# Patient Record
Sex: MALE | Race: BLACK OR AFRICAN AMERICAN
[De-identification: names, ages, dates, MRNs, and addresses within clinical notes are randomized per-mention and may not be internally consistent; named-entity substitution may affect disease eponyms.]

---

## 2018-05-19 ENCOUNTER — HOSPITAL ENCOUNTER (EMERGENCY)
Dept: HOSPITAL 76 - ED | Age: 18
LOS: 1 days | Discharge: HOME | End: 2018-05-20
Payer: COMMERCIAL

## 2018-05-19 DIAGNOSIS — K91.89: Primary | ICD-10-CM

## 2018-05-19 LAB
ALBUMIN DIAFP-MCNC: 4.2 G/DL (ref 3.2–5.5)
ALBUMIN/GLOB SERPL: 1.1 {RATIO} (ref 1–2.2)
ALP SERPL-CCNC: 63 IU/L (ref 42–121)
ALT SERPL W P-5'-P-CCNC: 106 IU/L (ref 10–60)
ANION GAP SERPL CALCULATED.4IONS-SCNC: 9 MMOL/L (ref 6–13)
AST SERPL W P-5'-P-CCNC: 45 IU/L (ref 10–42)
BASOPHILS NFR BLD AUTO: 0.2 10^3/UL (ref 0–0.1)
BASOPHILS NFR BLD AUTO: 1.7 %
BILIRUB BLD-MCNC: 1.4 MG/DL (ref 0.2–1)
BUN SERPL-MCNC: 14 MG/DL (ref 6–20)
CALCIUM UR-MCNC: 9.5 MG/DL (ref 8.5–10.3)
CHLORIDE SERPL-SCNC: 98 MMOL/L (ref 101–111)
CLARITY UR REFRACT.AUTO: CLEAR
CO2 SERPL-SCNC: 29 MMOL/L (ref 21–32)
CREAT SERPLBLD-SCNC: 1 MG/DL (ref 0.6–1.2)
EOSINOPHIL # BLD AUTO: 0.5 10^3/UL (ref 0–0.7)
EOSINOPHIL NFR BLD AUTO: 5.1 %
ERYTHROCYTE [DISTWIDTH] IN BLOOD BY AUTOMATED COUNT: 13.5 % (ref 12–15)
GFRSERPLBLD MDRD-ARVRAT: 95 ML/MIN/{1.73_M2} (ref 89–?)
GLOBULIN SER-MCNC: 3.9 G/DL (ref 2.1–4.2)
GLUCOSE SERPL-MCNC: 100 MG/DL (ref 70–100)
GLUCOSE UR QL STRIP.AUTO: NEGATIVE MG/DL
HGB UR QL STRIP: 12.1 G/DL (ref 14–18)
KETONES UR QL STRIP.AUTO: NEGATIVE MG/DL
LIPASE SERPL-CCNC: 19 U/L (ref 22–51)
LYMPHOCYTES # SPEC AUTO: 1.5 10^3/UL (ref 1.5–3.5)
LYMPHOCYTES NFR BLD AUTO: 15.4 %
MCH RBC QN AUTO: 29.4 PG (ref 27–31)
MCHC RBC AUTO-ENTMCNC: 33.7 G/DL (ref 32–36)
MCV RBC AUTO: 87.2 FL (ref 80–94)
MONOCYTES # BLD AUTO: 1.2 10^3/UL (ref 0–1)
MONOCYTES NFR BLD AUTO: 12.8 %
NEUTROPHILS # BLD AUTO: 6.2 10^3/UL (ref 1.5–6.6)
NEUTROPHILS # SNV AUTO: 9.5 X10^3/UL (ref 4.8–10.8)
NEUTROPHILS NFR BLD AUTO: 65 %
NITRITE UR QL STRIP.AUTO: NEGATIVE
PDW BLD AUTO: 8.8 FL (ref 7.4–11.4)
PH UR STRIP.AUTO: 6.5 PH (ref 5–7.5)
PLATELET # BLD: 253 10^3/UL (ref 130–450)
PROT SPEC-MCNC: 8.1 G/DL (ref 6.7–8.2)
PROT UR STRIP.AUTO-MCNC: NEGATIVE MG/DL
RBC # UR STRIP.AUTO: NEGATIVE /UL
RBC MAR: 4.1 10^6/UL (ref 4.7–6.1)
SODIUM SERPLBLD-SCNC: 136 MMOL/L (ref 135–145)
SP GR UR STRIP.AUTO: 1.01 (ref 1–1.03)
UROBILINOGEN UR QL STRIP.AUTO: (no result) E.U./DL
UROBILINOGEN UR STRIP.AUTO-MCNC: NEGATIVE MG/DL

## 2018-05-19 PROCEDURE — 87086 URINE CULTURE/COLONY COUNT: CPT

## 2018-05-19 PROCEDURE — 99284 EMERGENCY DEPT VISIT MOD MDM: CPT

## 2018-05-19 PROCEDURE — 74177 CT ABD & PELVIS W/CONTRAST: CPT

## 2018-05-19 PROCEDURE — 96374 THER/PROPH/DIAG INJ IV PUSH: CPT

## 2018-05-19 PROCEDURE — 81001 URINALYSIS AUTO W/SCOPE: CPT

## 2018-05-19 PROCEDURE — 36415 COLL VENOUS BLD VENIPUNCTURE: CPT

## 2018-05-19 PROCEDURE — 85025 COMPLETE CBC W/AUTO DIFF WBC: CPT

## 2018-05-19 PROCEDURE — 99283 EMERGENCY DEPT VISIT LOW MDM: CPT

## 2018-05-19 PROCEDURE — 81003 URINALYSIS AUTO W/O SCOPE: CPT

## 2018-05-19 PROCEDURE — 96361 HYDRATE IV INFUSION ADD-ON: CPT

## 2018-05-19 PROCEDURE — 80053 COMPREHEN METABOLIC PANEL: CPT

## 2018-05-19 PROCEDURE — 83605 ASSAY OF LACTIC ACID: CPT

## 2018-05-19 PROCEDURE — 83690 ASSAY OF LIPASE: CPT

## 2018-05-19 RX ADMIN — ONDANSETRON STA MG: 2 INJECTION INTRAMUSCULAR; INTRAVENOUS at 22:13

## 2018-05-19 RX ADMIN — ONDANSETRON STA MG: 2 INJECTION INTRAMUSCULAR; INTRAVENOUS at 22:04

## 2018-05-19 NOTE — ED PHYSICIAN DOCUMENTATION
PD HPI ABD PAIN





- Stated complaint


Stated Complaint: ABD PX





- Chief complaint


Chief Complaint: Abd Pain





- History obtained from


History obtained from: Patient, Family





- History of Present Illness


Timing - onset: Today


Timing - details: Gradual onset, Still present


Quality: Cramping, Aching


Location: All over / everywhere, RLQ


Worsened by: Position, Palpation


Associated symptoms: Nausea, Constipation.  No: Fever


Similar symptoms before: Work up / diagnostics, Treatment


Recently seen: Surgery





- Additional information


Additional information: 





Patient is a 18 year old male status post appendectomy three days prior who is 

presenting to the emergency department for abdominal pain.  According to mother 

and patient, patient had a complicated surgery after his appendix had ruptured 

a few months before.  Patient states that today he developed worsening 

abdominal pain, and dysuria.  Patient states that his last bowel movement was 

yesterday.  





Review of Systems


Ten Systems: 10 systems reviewed and negative


Constitutional: reports: Chills.  denies: Fever


GI: reports: Abdominal Pain, Constipation.  denies: Nausea, Vomiting


: reports: Dysuria





PD PAST MEDICAL HISTORY





- Past Medical History


Past Medical History: Yes





- Past Surgical History


Past Surgical History: Yes


General: Appendectomy





- Present Medications


Home Medications: 


 Ambulatory Orders











 Medication  Instructions  Recorded  Confirmed


 


oxyCODONE/ACET 5/325 [Percocet 5 1 tab PO Q6H PRN 05/19/18 05/19/18





mg/325 mg]   














- Allergies


Allergies/Adverse Reactions: 


 Allergies











Allergy/AdvReac Type Severity Reaction Status Date / Time


 


No Known Drug Allergies Allergy   Verified 05/19/18 21:37














- Social History


Does the pt smoke?: No


Smoking Status: Never smoker


Does the pt drink ETOH?: No


Does the pt have substance abuse?: No





- Immunizations


Immunizations are current?: Yes





- POLST


Patient has POLST: No





PD ED PE NORMAL





- Vitals


Vital signs reviewed: Yes





- General


General: Alert and oriented X 3, No acute distress, Well developed/nourished





- HEENT


HEENT: Atraumatic, PERRL





- Neck


Neck: Supple, no meningeal sign





- Cardiac


Cardiac: RRR





- Respiratory


Respiratory: No respiratory distress, Clear bilaterally





- Derm


Derm: Normal color





- Extremities


Extremities: No deformity





- Neuro


Neuro: Alert and oriented X 3


Eye Opening: Spontaneous





PD ED PE EXPANDED





- Abdomen


Abdomen: Tender to palpation, Generalized/diffuse (diffuse tenderness to 

palpation,  mild ecchymosis around incision sites ), Surgical scars





Results





- Vitals


Vitals: 


 Vital Signs - 24 hr











  05/19/18 05/20/18





  21:32 00:06


 


Temperature 37.5 C 


 


Heart Rate 92 76


 


Respiratory 16 16





Rate  


 


Blood Pressure 130/73 120/76


 


O2 Saturation 100 98








 Oxygen











O2 Source                      Room air

















- Labs


Labs: 


 Laboratory Tests











  05/19/18 05/19/18 05/19/18





  22:00 22:00 22:00


 


WBC  9.5  


 


RBC  4.10 L  


 


Hgb  12.1 L  


 


Hct  35.8 L  


 


MCV  87.2  


 


MCH  29.4  


 


MCHC  33.7  


 


RDW  13.5  


 


Plt Count  253  


 


MPV  8.8  


 


Neut #  6.2  


 


Lymph #  1.5  


 


Mono #  1.2 H  


 


Eos #  0.5  


 


Baso #  0.2 H  


 


Absolute Nucleated RBC  0.00  


 


Nucleated RBC %  0.0  


 


Sodium   136 


 


Potassium   3.9 


 


Chloride   98 L 


 


Carbon Dioxide   29 


 


Anion Gap   9.0 


 


BUN   14 


 


Creatinine   1.0 


 


Estimated GFR (MDRD)   95 


 


Glucose   100 


 


Lactic Acid    1.0


 


Calcium   9.5 


 


Total Bilirubin   1.4 H 


 


AST   45 H 


 


ALT   106 H 


 


Alkaline Phosphatase   63 


 


Total Protein   8.1 


 


Albumin   4.2 


 


Globulin   3.9 


 


Albumin/Globulin Ratio   1.1 


 


Lipase   19 L 


 


Urine Color   


 


Urine Clarity   


 


Urine pH   


 


Ur Specific Gravity   


 


Urine Protein   


 


Urine Glucose (UA)   


 


Urine Ketones   


 


Urine Occult Blood   


 


Urine Nitrite   


 


Urine Bilirubin   


 


Urine Urobilinogen   


 


Ur Leukocyte Esterase   


 


Ur Microscopic Review   


 


Urine Culture Comments   














  05/19/18





  23:01


 


WBC 


 


RBC 


 


Hgb 


 


Hct 


 


MCV 


 


MCH 


 


MCHC 


 


RDW 


 


Plt Count 


 


MPV 


 


Neut # 


 


Lymph # 


 


Mono # 


 


Eos # 


 


Baso # 


 


Absolute Nucleated RBC 


 


Nucleated RBC % 


 


Sodium 


 


Potassium 


 


Chloride 


 


Carbon Dioxide 


 


Anion Gap 


 


BUN 


 


Creatinine 


 


Estimated GFR (MDRD) 


 


Glucose 


 


Lactic Acid 


 


Calcium 


 


Total Bilirubin 


 


AST 


 


ALT 


 


Alkaline Phosphatase 


 


Total Protein 


 


Albumin 


 


Globulin 


 


Albumin/Globulin Ratio 


 


Lipase 


 


Urine Color  YELLOW


 


Urine Clarity  CLEAR


 


Urine pH  6.5


 


Ur Specific Gravity  1.010


 


Urine Protein  NEGATIVE


 


Urine Glucose (UA)  NEGATIVE


 


Urine Ketones  NEGATIVE


 


Urine Occult Blood  NEGATIVE


 


Urine Nitrite  NEGATIVE


 


Urine Bilirubin  NEGATIVE


 


Urine Urobilinogen  0.2 (NORMAL)


 


Ur Leukocyte Esterase  NEGATIVE


 


Ur Microscopic Review  NOT INDICATED


 


Urine Culture Comments  NOT INDICATED














- Rads (name of study)


  ** ct abd pelvis


Radiology: Final report received, Discussed with rads (multiple hematomas), See 

rad report





PD MEDICAL DECISION MAKING





- ED course


Complexity details: reviewed old records, reviewed results, re-evaluated patient

, considered differential, d/w patient, d/w family, d/w consultant


ED course: 





Patient was seen and examined at bedside.  IV access was gained and labs were 

drawn.  patient was treated with IV fluids but he stated he did not want pain 

medication.  Imaging was ordered.  When patient returned from imaging the 

results were discussed with radiologist who stated that there were two 

hematomas in the abdomen.  Case was then discussed with on call surgeon who 

stated if the patient was well appearing, a febrile and no white count he could 

follow up with his surgeon.  Patient and family were made aware of the 

findings.  They were comfortable with the discharge and follow up plans.  

patient was stable for discharge with outpatient follow up.  





Departure





- Departure


Disposition: 01 Home, Self Care


Clinical Impression: 


 GI complications of surgery





Condition: Good


Instructions:  Abdominal Pain


Follow-Up: 


primary,care provider [Other] - Within 3 Days


Comments: 


Your CT today showed hematomas but no signs of infection.  It is important that 

you follow up with your surgeon tomorrow or monday.  You can take motrin (600mg

) and tylenol (1000 mg).for pain.  You should return to the emergency 

department for fevers, chills, new, worsening or unocntrollable symptoms.  


Discharge Date/Time: 05/20/18 00:06

## 2018-05-19 NOTE — CT REPORT
EXAM:

CT ABDOMEN AND PELVIS

 

EXAM DATE: 5/19/2018 10:56 PM.

 

CLINICAL HISTORY: Abdominal pain

 

COMPARISONS: None.

 

TECHNIQUE: Routine helical CT imaging was performed through the abdomen and pelvis. IV contrast: 100 
ML ISOVUE 300. Enteric contrast: No. Reconstructions: Coronal and sagittal.

 

In accordance with CT protocol optimization, one or more of the following dose reduction techniques w
ere utilized for this exam: automated exposure control, adjustment of mA and/or KV based on patient s
ize, or use of iterative reconstructive technique.

 

FINDINGS: 

Lung Bases: Unremarkable.

 

Liver: Normal. No masses.

 

Gallbladder/Bile Ducts: Unremarkable.

 

Spleen: Normal.

 

Pancreas: Normal.

 

Adrenal Glands: Normal.

 

Kidneys: There are areas of ill-defined cortical hypodensity within the kidneys. No significant perin
ephric stranding. No hydronephrosis.

 

Peritoneal Cavity/Bowel: There is an area of hyperdensity measuring approximately 6 x 8 cm within the
 lower anterior abdomen (image 66 series 3). There is a second area of focal hyperdensity measuring a
pproximate 6 x 7 cm within the pelvis (image 77). These are consistent with areas of hematoma. There 
is a small amount of free hemoperitoneum within the lower abdomen and pelvis. The appendix is surgica
lly absent. No intraperitoneal free air. Stomach, small bowel, and colon demonstrate no acute abnorma
lities.

 

Pelvic Organs: The urinary bladder demonstrates no significant abnormalities.

 

Vasculature: No aneurysms or other significant abnormality.

 

Bones: No significant abnormality.

 

Other: None.

 

IMPRESSION: 

1. There are several areas of focal hyperdensity within the lower abdomen and pelvis. These are suspi
cious for areas of focal postoperative hematoma. There is an anterior lower abdomen region measuring 
8 x 6 cm, and a posterior pelvic region measuring 6 x 7 cm. There is a small amount of free fluid in 
the low abdomen and pelvis as well.

2. No evidence of bowel obstruction. No abnormal stool burden. 

3. There are areas of ill-defined cortical hypodensity within the kidneys. This could be seen with py
elonephritis. No evidence of significant perinephric stranding or hydronephrosis. No evidence of foca
l renal abscess.

 

[facility]

 

The above findings  were discussed with Nick Landers by Dr. Shannon Louie at 23:17 hrs on 5/19
/18.

Referring Provider Line: [phone]

 

SITE ID: [id number]

## 2018-05-20 VITALS — SYSTOLIC BLOOD PRESSURE: 120 MMHG | DIASTOLIC BLOOD PRESSURE: 76 MMHG
